# Patient Record
Sex: MALE | Race: BLACK OR AFRICAN AMERICAN | Employment: FULL TIME | URBAN - METROPOLITAN AREA
[De-identification: names, ages, dates, MRNs, and addresses within clinical notes are randomized per-mention and may not be internally consistent; named-entity substitution may affect disease eponyms.]

---

## 2021-09-15 ENCOUNTER — VIRTUAL VISIT (OUTPATIENT)
Dept: FAMILY MEDICINE | Facility: CLINIC | Age: 51
End: 2021-09-15

## 2021-09-15 DIAGNOSIS — R05.9 COUGH: ICD-10-CM

## 2021-09-15 DIAGNOSIS — J45.30 MILD PERSISTENT ASTHMA WITHOUT COMPLICATION: Primary | ICD-10-CM

## 2021-09-15 DIAGNOSIS — J20.9 ACUTE BRONCHITIS, UNSPECIFIED ORGANISM: ICD-10-CM

## 2021-09-15 DIAGNOSIS — J30.89 SEASONAL ALLERGIC RHINITIS DUE TO OTHER ALLERGIC TRIGGER: ICD-10-CM

## 2021-09-15 PROCEDURE — 99203 OFFICE O/P NEW LOW 30 MIN: CPT | Mod: 95 | Performed by: FAMILY MEDICINE

## 2021-09-15 RX ORDER — CETIRIZINE HYDROCHLORIDE 10 MG/1
10 TABLET ORAL DAILY
Qty: 30 TABLET | Refills: 0 | Status: SHIPPED | OUTPATIENT
Start: 2021-09-15 | End: 2021-10-06

## 2021-09-15 RX ORDER — FLUTICASONE PROPIONATE 50 MCG
2 SPRAY, SUSPENSION (ML) NASAL DAILY
Qty: 16 G | Refills: 0 | Status: SHIPPED | OUTPATIENT
Start: 2021-09-15 | End: 2021-10-06

## 2021-09-15 RX ORDER — ALBUTEROL SULFATE 90 UG/1
2 AEROSOL, METERED RESPIRATORY (INHALATION) EVERY 4 HOURS PRN
Qty: 18 G | Refills: 0 | Status: SHIPPED | OUTPATIENT
Start: 2021-09-15

## 2021-09-15 RX ORDER — CODEINE PHOSPHATE AND GUAIFENESIN 10; 100 MG/5ML; MG/5ML
1 SOLUTION ORAL EVERY 6 HOURS PRN
Qty: 120 ML | Refills: 0 | Status: SHIPPED | OUTPATIENT
Start: 2021-09-15 | End: 2021-10-06

## 2021-09-15 RX ORDER — AZITHROMYCIN 250 MG/1
TABLET, FILM COATED ORAL
Qty: 6 TABLET | Refills: 0 | Status: SHIPPED | OUTPATIENT
Start: 2021-09-15 | End: 2021-09-20

## 2021-09-15 NOTE — PROGRESS NOTES
Jose is a 51 year old who is being evaluated via a billable video visit.      How would you like to obtain your AVS? Medardo  If the video visit is dropped, the invitation should be resent by: Text to cell phone: See epic  Will anyone else be joining your video visit? No    Video Start Time: 2:55pm    Assessment & Plan     Mild persistent asthma without complication  Prescriptions given for his asthma medications as patient requested  He will establish care with one of our internist here for ongoing care  If patient's symptoms are not any better in 1 week, he understands to call to schedule for in person visit for further evaluation      - mometasone-formoterol (DULERA) 200-5 MCG/ACT inhaler; Inhale 2 puffs into the lungs 2 times daily  - albuterol (PROAIR HFA/PROVENTIL HFA/VENTOLIN HFA) 108 (90 Base) MCG/ACT inhaler; Inhale 2 puffs into the lungs every 4 hours as needed for shortness of breath / dyspnea or wheezing    Cough  ddx-acute bronchitis  Patient reported having side effects and intolerance to Mucinex and Tessalon  He also reported that he received a prescription for codeine syrup for cough from his pulmonologist and is specifically requesting the same  Prescription given  He does understand that if his cough is not any better in 1 week, he should be seen in person for further evaluation or sooner if needed  - albuterol (PROAIR HFA/PROVENTIL HFA/VENTOLIN HFA) 108 (90 Base) MCG/ACT inhaler; Inhale 2 puffs into the lungs every 4 hours as needed for shortness of breath / dyspnea or wheezing  - guaiFENesin-codeine (ROBITUSSIN AC) 100-10 MG/5ML solution; Take 5 mLs by mouth every 6 hours as needed for cough    Acute bronchitis, unspecified organism  We will start on azithromycin, take cough syrup as mentioned above as needed, continue with current inhalers  Return in 1 week to be seen in the clinic for in person evaluation if symptoms are not any better or sooner if needed  - azithromycin (ZITHROMAX) 250 MG  tablet; Take 2 tablets (500 mg) by mouth daily for 1 day, THEN 1 tablet (250 mg) daily for 4 days.  - albuterol (PROAIR HFA/PROVENTIL HFA/VENTOLIN HFA) 108 (90 Base) MCG/ACT inhaler; Inhale 2 puffs into the lungs every 4 hours as needed for shortness of breath / dyspnea or wheezing    Seasonal allergic rhinitis due to other allergic trigger  Continue with antihistamines and Flonase, refill sent per patient's request  - cetirizine (ZYRTEC) 10 MG tablet; Take 1 tablet (10 mg) by mouth daily  - fluticasone (FLONASE) 50 MCG/ACT nasal spray; Spray 2 sprays into both nostrils daily             Chart documentation done in part with Dragon Voice recognition Software. Although reviewed after completion, some word and grammatical error may remain.    See Patient Instructions    Return in about 1 week (around 9/22/2021), or if symptoms worsen or fail to improve.    Lexi Gomez MD  [facility]    Bay Garcia is a 51 year old who presents for the following health issues   Patient is here for a video visit instead of in person visit due to the current COVID-19 pandemic.  Patient is new to the provider, is here today for video visit for concerns for cough that is going on for 2+ weeks and to request his refills on asthma medications  Patient reports that he lost his wife at the end of August, has had URI symptoms since then.  He reported having 2 negative Covid test since the start of the symptoms.  Has history of allergies and asthma, is requesting refills on medications   He is also requesting prescription for codeine cough syrup, as he was given by his pulmonologist in the past  Patient reported having side effects and intolerance to Tessalon and over-the-counter Mucinex cough syrups  He denies concerns for chest pain, wheezing, shortness of breath, fever, chills  Continues to have postnasal drip, productive cough of yellow-green sputum with no blood in it  Patient was in the car, while  starting on the video visit ,had poor Internet connection  He was on his way to get his daughter from school, that happened during the visit  He denies having major significant past medical history      HPI           Review of Systems   CONSTITUTIONAL: NEGATIVE for fever, chills, change in weight  INTEGUMENTARY/SKIN: NEGATIVE for worrisome rashes, moles or lesions  EYES: NEGATIVE for vision changes or irritation  ENT/MOUTH: as above  RESP:as above  CV: NEGATIVE for chest pain, palpitations or peripheral edema  GI: NEGATIVE for nausea, abdominal pain, heartburn, or change in bowel habits  MUSCULOSKELETAL: NEGATIVE for significant arthralgias or myalgia  PSYCHIATRIC: NEGATIVE for changes in mood or affect      Objective           Vitals:  No vitals were obtained today due to virtual visit.    Physical Exam   GENERAL: Healthy, alert and no distress  EYES: Eyes grossly normal to inspection  RESP: No audible wheeze, cough, or visible cyanosis.  No visible retractions or increased work of breathing.    SKIN: Visible skin clear  PSYCH: Mentation appears normal, affect normal/bright, judgement and insight intact, normal speech and appearance well-groomed.                Video-Visit Details    Type of service:  Video Visit    Video End Time:3:06pm    Originating Location (pt. Location): Home    Distant Location (provider location):  [facility]     Platform used for Video Visit: Doximity   Patient was in the car, while starting on the video visit ,had poor Internet connection  He was on his way to get his daughter from school, that happened during the visit

## 2021-09-15 NOTE — PATIENT INSTRUCTIONS
Take medications as prescribed  If your symptoms are not any better in 1 week, please call to schedule for in person visit for further evaluation

## 2021-10-06 ENCOUNTER — VIRTUAL VISIT (OUTPATIENT)
Dept: FAMILY MEDICINE | Facility: CLINIC | Age: 51
End: 2021-10-06

## 2021-10-06 DIAGNOSIS — J30.89 SEASONAL ALLERGIC RHINITIS DUE TO OTHER ALLERGIC TRIGGER: ICD-10-CM

## 2021-10-06 DIAGNOSIS — J45.901 EXACERBATION OF PERSISTENT ASTHMA, UNSPECIFIED ASTHMA SEVERITY: Primary | ICD-10-CM

## 2021-10-06 DIAGNOSIS — R05.9 COUGH: ICD-10-CM

## 2021-10-06 PROCEDURE — 99214 OFFICE O/P EST MOD 30 MIN: CPT | Mod: 95 | Performed by: FAMILY MEDICINE

## 2021-10-06 RX ORDER — FLUTICASONE PROPIONATE 50 MCG
2 SPRAY, SUSPENSION (ML) NASAL DAILY
Qty: 16 G | Refills: 0 | Status: SHIPPED | OUTPATIENT
Start: 2021-10-06

## 2021-10-06 RX ORDER — CETIRIZINE HYDROCHLORIDE 10 MG/1
10 TABLET ORAL DAILY
Qty: 30 TABLET | Refills: 0 | Status: SHIPPED | OUTPATIENT
Start: 2021-10-06

## 2021-10-06 NOTE — PROGRESS NOTES
Observation Brochure and Video    Jose is a 51 year old who is being evaluated via a billable video visit.      How would you like to obtain your AVS? Mail a copy  If the video visit is dropped, the invitation should be resent by: Text to cell phone: [phone]  Will anyone else be joining your video visit? No      Video Start Time: 2:46 PM    Assessment & Plan     Exacerbation of persistent asthma, unspecified asthma severity  Refills provided again for Dulera as patient states that he now has insurance and can pick this up.  He denies significant shortness of breath and declines oral steroid burst or taper.  He states that he does not need a refill of his albuterol.  Plan follow-up in 1 week if not improving.  - mometasone-formoterol (DULERA) 200-5 MCG/ACT inhaler; Inhale 2 puffs into the lungs 2 times daily    Cough  Patient requests guaifenesin with codeine cough syrup or promethazine cough syrup, both of which I discussed with patient that I do not prescribe.  Discussed plain guaifenesin or dextromethorphan, patient reports intolerances to these, but can tolerate guaifenesin with codeine, interestingly.    Seasonal allergic rhinitis due to other allergic trigger  Refills provided for cetirizine and Flonase as patient says that his insurance is now active and he can refill these.  - cetirizine (ZYRTEC) 10 MG tablet; Take 1 tablet (10 mg) by mouth daily  - fluticasone (FLONASE) 50 MCG/ACT nasal spray; Spray 2 sprays into both nostrils daily                 Return in about 1 week (around 10/13/2021), or if symptoms worsen or fail to improve.    Lian Malcolm MD  [facility]    Bay Garcia is a 51 year old who presents for the following health issues     HPI     Patient presents today for a video visit to discuss seasonal allergies and an asthma exacerbation with cough.  He reports a runny nose and itchy eyes related to his allergies, and cough that has been ongoing for  about 1.5 weeks.  He reports that his wife passed away a couple of weeks ago and this is when his symptoms started.  He says he is out of his medications.  He has had some greenish sputum with cough and says that he was diagnosed with cough-variant asthma in the past and prescribed Dulera.  He denies significant shortness of breath and reports that he has had 2 COVID tests in the recent past that have been negative.  He requests guaifenesin with codeine cough syrup to use at night.  When I noted that I do not prescribe this medication, he then requests promethazine cough syrup.    Interestingly, upon review of the EMR, patient had a very similar visit on 9/15/21 during which he reported that his wife passed away near the end of August and he developed worsening cough and asthma symptoms since that time.  At that visit, he was prescribed azithromycin for bronchitis, and his Dulera, albuterol, cetirizine and Flonase were refilled.  At that time, he was also given a prescription for guaifenesin with codeine cough syrup.  It does not appear based on PDMP data that this was filled.    Review of Systems   Constitutional, HEENT, cardiovascular, pulmonary, gi and gu systems are negative, except as otherwise noted.      Objective           Vitals:  No vitals were obtained today due to virtual visit.    Physical Exam   GENERAL: fatigued and lying in bed during the examination  EYES: eyes nearly closed throughout the visit  RESP: No audible wheeze, cough, or visible cyanosis.  No visible retractions or increased work of breathing.    SKIN: Visible skin clear. No significant rash, abnormal pigmentation or lesions.  NEURO: Cranial nerves grossly intact.  Mentation and speech appropriate for age.  PSYCH: Mentation appears normal, affect normal/bright, judgement and insight intact, normal speech and appearance well-groomed.        Video-Visit Details    Type of service:  Video Visit    Video End Time:2:53 PM    Originating  Location (pt. Location): Home    Distant Location (provider location):  [facility]     Platform used for Video Visit: Debbie